# Patient Record
Sex: FEMALE
[De-identification: names, ages, dates, MRNs, and addresses within clinical notes are randomized per-mention and may not be internally consistent; named-entity substitution may affect disease eponyms.]

---

## 2020-05-22 NOTE — EDM.PDOC
ED HPI GENERAL MEDICAL PROBLEM





- General


Chief Complaint: General


Stated Complaint: BACK PAIN, SOB


Time Seen by Provider: 20 14:50


Source of Information: Reports: Patient


History Limitations: Reports: No Limitations





- History of Present Illness


INITIAL COMMENTS - FREE TEXT/NARRATIVE: 





This patient presents to the ED for evaluation of back pain. She states that 

she developed a burning pain across her back between her shoulder blades about 

4 days ago. She has tried Tylenol for it but that has not helped much. She also 

has noticed a feeling of fullness in her chest as well as some "discomfort" 

down her left arm. She denies any chest pain. She also has noticed some 

shortness of breath in the past couple of days has well. She denies any recent 

illness including fever, cough or sore throat.


Onset: Gradual


Onset Date: 20


Duration: Getting Worse





- Related Data


 Allergies











Allergy/AdvReac Type Severity Reaction Status Date / Time


 


Sulfa (Sulfonamide Allergy  Rash Verified 16 09:05





Antibiotics)     


 


Tetracyclines Allergy  Nausea Verified 16 09:05











Home Meds: 


 Home Meds





Cholecalciferol (Vitamin D3) [Vitamin D] 1,000 units PO DAILY 09/07/15 [History]


Simvastatin 10 mg PO DAILY 09/07/15 [History]


Tiotropium [Spiriva Handihaler] 18 mcg INH DAILY 09/07/15 [History]


hydroCHLOROthiazide [Hydrochlorothiazide] 12.5 mg PO DAILY 11/08/15 [History]











Past Medical History


Other HEENT History: reading glassess


Cardiovascular History: Reports: Hypertension


Respiratory History: Reports: COPD, SOB


Other Gastrointestinal History: constipation at times


OB/GYN History: Reports: Pregnancy, Other (See Below)


Other OB/GYN History:  section 2x


Musculoskeletal History: Reports: Arthritis


Psychiatric History: Reports: Anxiety


Endocrine/Metabolic History: Reports: None





- Past Surgical History


HEENT Surgical History: Reports: None


Cardiovascular Surgical History: Reports: None


Respiratory Surgical History: Reports: None


GI Surgical History: Reports: None





Social & Family History





- Tobacco Use


Smoking Status *Q: Former Smoker


Used Tobacco, but Quit: Yes


Month/Year Tobacco Last Used: 


Second Hand Smoke Exposure: No





- Caffeine Use


Caffeine Use: Reports: Coffee





- Recreational Drug Use


Recreational Drug Use: No





ED ROS GENERAL





- Review of Systems


Review Of Systems: Comprehensive ROS is negative, except as noted in HPI.





ED EXAM, GENERAL





- Physical Exam


Exam: See Below


Exam Limited By: No Limitations


General Appearance: Alert, WD/WN, No Apparent Distress


Eye Exam: Bilateral Eye: PERRL


Ears: Normal External Exam


Nose: Normal Inspection


Throat/Mouth: Normal Inspection


Head: Atraumatic, Normocephalic


Neck: Normal Inspection, Supple, Full Range of Motion


Respiratory/Chest: No Respiratory Distress, Lungs Clear, Normal Breath Sounds, 

Chest Non-Tender


Cardiovascular: Regular Rate, Rhythm


Back Exam: Normal Inspection, Other (burning pain is not reproducible)


Neurological: Alert, Oriented


Psychiatric: Normal Affect, Normal Mood


Skin Exam: Warm, Intact





EKG INTERPRETATION


EKG Date: 20


Time: 15:20


Rhythm: NSR


Rate (Beats/Min): 75


Axis: Normal


P-Wave: Present


QRS: Normal


ST-T: Normal


QT: Normal





Course





- Vital Signs


Last Recorded V/S: 


 Last Vital Signs











Temp  36.3 C   20 14:54


 


Pulse  80   20 14:54


 


Resp  16   20 14:54


 


BP  161/82 H  20 14:54


 


Pulse Ox  99   20 14:54














- Orders/Labs/Meds


Orders: 


 Active Orders 24 hr











 Category Date Time Status


 


 EKG Documentation Completion [RC] ASDIRECTED Care  20 15:26 Active


 


 Chest wo Cont [CT] Stat Exams  20 14:59 Taken


 


 EKG 12 Lead [EK] Routine Ther  20 15:26 Ordered














- Re-Assessments/Exams


Free Text/Narrative Re-Assessment/Exam: 





20 15:56


This patient presents for evaluation of upper back pain. Based on history and 

exam, this is consistent with back pain of a musculoskeletal etiology. The pain 

is non-radicular in nature and non-reproducible; there is nothing on exam to 

indicate a referred pain from the chest of cardiac or pulmonary etiology such 

as pneumonia, pneumothorax, PE, cardiac complications or rib fracture. There 

are no red flag symptoms to suggest acute neurologic emergency, acute spinal 

cord or nerve root compression or other acute nerve injury to indicate emergent 

MRI. I do not suspect referred pain from an intraabdominal or urologic process. 

The patient was encouraged to use ibuprofen as well as ice and rest for pain 

control. The patient was instructed to follow up with PCP in 1-2 weeks for 

failure to improve. The patient was given return precautions including 

uncontrolled pain, chest pain or shortness of breath, fever or new onset 

weakness.





Departure





- Departure


Time of Disposition: 16:00


Disposition: Home, Self-Care 01


Condition: Good


Clinical Impression: 


 Back pain








- Discharge Information


Referrals: 


PCP,None [Primary Care Provider] - 


Forms:  ED Department Discharge





Sepsis Event Note





- Evaluation


Sepsis Screening Result: No Definite Risk





- Focused Exam


Vital Signs: 


 Vital Signs











  Temp Pulse Resp BP Pulse Ox


 


 20 14:54  36.3 C  80  16  161/82 H  99


 


 20 14:40  36.3 C  80  16  161/82 H  99











Date Exam was Performed: 20


Time Exam was Performed: 15:55





- My Orders


Last 24 Hours: 


My Active Orders





20 14:59


Chest wo Cont [CT] Stat 





20 15:26


EKG Documentation Completion [RC] ASDIRECTED 


EKG 12 Lead [EK] Routine 














- Assessment/Plan


Last 24 Hours: 


My Active Orders





20 14:59


Chest wo Cont [CT] Stat 





20 15:26


EKG Documentation Completion [RC] ASDIRECTED 


EKG 12 Lead [EK] Routine

## 2020-05-25 NOTE — CT
DATE OF SERVICE:  05/22/2020

CLINICAL DATA:  Back pain, chest tightness.



UNENHANCED CHEST CT:



Multislice acquisition through the chest without IV contrast was performed.  



No priors.  



There are extensive emphysematous changes throughout both lungs.  No areas of 
consolidation.  No masses. 



No pleural effusion.  No pneumothorax.  



The heart size is normal.  No significant pericardial effusion.  



No aortic aneurysm.  



No hilar or mediastinal adenopathy.  



There is heterogeneous fatty infiltration of the liver.  



IMPRESSION: 

Findings consistent with COPD.  No acute abnormalities. 



[id number]
[facility]

## 2023-02-13 ENCOUNTER — HOSPITAL ENCOUNTER (EMERGENCY)
Dept: HOSPITAL 60 - LB.ED | Age: 68
Discharge: HOME | End: 2023-02-13
Payer: MEDICARE

## 2023-02-13 VITALS — SYSTOLIC BLOOD PRESSURE: 129 MMHG | DIASTOLIC BLOOD PRESSURE: 61 MMHG | HEART RATE: 87 BPM

## 2023-02-13 DIAGNOSIS — Z86.73: ICD-10-CM

## 2023-02-13 DIAGNOSIS — I10: ICD-10-CM

## 2023-02-13 DIAGNOSIS — Z88.2: ICD-10-CM

## 2023-02-13 DIAGNOSIS — Z79.899: ICD-10-CM

## 2023-02-13 DIAGNOSIS — Z88.1: ICD-10-CM

## 2023-02-13 DIAGNOSIS — Z79.82: ICD-10-CM

## 2023-02-13 DIAGNOSIS — S43.421A: Primary | ICD-10-CM

## 2023-02-13 DIAGNOSIS — J44.9: ICD-10-CM

## 2023-02-13 DIAGNOSIS — Z88.5: ICD-10-CM

## 2023-02-13 LAB — TROPONIN I SERPL HS-MCNC: 7.2 PG/ML (ref ?–60.4)

## 2023-08-21 ENCOUNTER — HOSPITAL ENCOUNTER (EMERGENCY)
Dept: HOSPITAL 60 - LB.ED | Age: 68
Discharge: HOME | End: 2023-08-21
Payer: MEDICARE

## 2023-08-21 VITALS — SYSTOLIC BLOOD PRESSURE: 123 MMHG | DIASTOLIC BLOOD PRESSURE: 80 MMHG | HEART RATE: 80 BPM

## 2023-08-21 DIAGNOSIS — M19.90: ICD-10-CM

## 2023-08-21 DIAGNOSIS — Z79.899: ICD-10-CM

## 2023-08-21 DIAGNOSIS — J43.9: ICD-10-CM

## 2023-08-21 DIAGNOSIS — Z88.5: ICD-10-CM

## 2023-08-21 DIAGNOSIS — Z98.890: ICD-10-CM

## 2023-08-21 DIAGNOSIS — Z88.1: ICD-10-CM

## 2023-08-21 DIAGNOSIS — K03.81: Primary | ICD-10-CM

## 2023-08-21 DIAGNOSIS — I10: ICD-10-CM

## 2023-08-21 DIAGNOSIS — Z88.2: ICD-10-CM

## 2023-08-21 DIAGNOSIS — Z79.82: ICD-10-CM
